# Patient Record
Sex: FEMALE | Race: WHITE | Employment: FULL TIME | URBAN - METROPOLITAN AREA
[De-identification: names, ages, dates, MRNs, and addresses within clinical notes are randomized per-mention and may not be internally consistent; named-entity substitution may affect disease eponyms.]

---

## 2018-12-20 ENCOUNTER — OFFICE VISIT (OUTPATIENT)
Dept: FAMILY MEDICINE CLINIC | Facility: CLINIC | Age: 63
End: 2018-12-20

## 2018-12-20 VITALS
HEIGHT: 70 IN | HEART RATE: 89 BPM | DIASTOLIC BLOOD PRESSURE: 72 MMHG | WEIGHT: 179 LBS | TEMPERATURE: 99 F | OXYGEN SATURATION: 97 % | BODY MASS INDEX: 25.62 KG/M2 | SYSTOLIC BLOOD PRESSURE: 118 MMHG

## 2018-12-20 DIAGNOSIS — J06.9 UPPER RESPIRATORY TRACT INFECTION, UNSPECIFIED TYPE: Primary | ICD-10-CM

## 2018-12-20 DIAGNOSIS — J02.9 SORE THROAT: ICD-10-CM

## 2018-12-20 PROCEDURE — 99203 OFFICE O/P NEW LOW 30 MIN: CPT | Performed by: PHYSICIAN ASSISTANT

## 2018-12-20 PROCEDURE — 87880 STREP A ASSAY W/OPTIC: CPT | Performed by: PHYSICIAN ASSISTANT

## 2018-12-20 RX ORDER — FLUTICASONE PROPIONATE 50 MCG
2 SPRAY, SUSPENSION (ML) NASAL DAILY
Qty: 1 BOTTLE | Refills: 0 | Status: SHIPPED | OUTPATIENT
Start: 2018-12-20 | End: 2019-01-03

## 2018-12-20 NOTE — PROGRESS NOTES
CHIEF COMPLAINT:   Patient presents with:  Sinus Problem: sore throat/runny nose x 2 days. no fever/vomiting/diarrhea      HPI:   Santiago Pugh is a 66year old female who presents for upper respiratory symptoms for 2 days.  Patient reports nasal congest Posterior pharynx is mildly erythematous. No exudates. Uvula midline. NECK: Supple, non-tender  LUNGS: clear to auscultation bilaterally, no wheezes or rhonchi. Breathing is non labored.   CARDIO: RRR without murmur  EXTREMITIES: no cyanosis, clubbing or

## 2018-12-20 NOTE — PATIENT INSTRUCTIONS
Patient Declined AVS    Verbal Instructions given      1. Flonase  2. Claritin D  3. Increase fluids/ rest  4.  Follow up with PCP

## 2022-02-07 ENCOUNTER — EKG ENCOUNTER (OUTPATIENT)
Dept: LAB | Age: 67
End: 2022-02-07
Attending: ORTHOPAEDIC SURGERY
Payer: COMMERCIAL

## 2022-02-07 ENCOUNTER — LABORATORY ENCOUNTER (OUTPATIENT)
Dept: LAB | Age: 67
End: 2022-02-07
Attending: ORTHOPAEDIC SURGERY
Payer: COMMERCIAL

## 2022-02-07 DIAGNOSIS — M19.012 PRIMARY OSTEOARTHRITIS OF LEFT SHOULDER: ICD-10-CM

## 2022-02-07 LAB
ANION GAP SERPL CALC-SCNC: 6 MMOL/L (ref 0–18)
ANTIBODY SCREEN: NEGATIVE
BASOPHILS # BLD AUTO: 0.05 X10(3) UL (ref 0–0.2)
BASOPHILS NFR BLD AUTO: 0.5 %
BUN BLD-MCNC: 21 MG/DL (ref 7–18)
CALCIUM BLD-MCNC: 10.2 MG/DL (ref 8.5–10.1)
CHLORIDE SERPL-SCNC: 106 MMOL/L (ref 98–112)
CO2 SERPL-SCNC: 27 MMOL/L (ref 21–32)
CREAT BLD-MCNC: 0.68 MG/DL
EOSINOPHIL NFR BLD AUTO: 1.9 %
ERYTHROCYTE [DISTWIDTH] IN BLOOD BY AUTOMATED COUNT: 12.2 %
GLUCOSE BLD-MCNC: 112 MG/DL (ref 70–99)
HCT VFR BLD AUTO: 39.4 %
HGB BLD-MCNC: 13.4 G/DL
IMM GRANULOCYTES # BLD AUTO: 0.03 X10(3) UL (ref 0–1)
IMM GRANULOCYTES NFR BLD: 0.3 %
LYMPHOCYTES # BLD AUTO: 2.21 X10(3) UL (ref 1–4)
LYMPHOCYTES NFR BLD AUTO: 23 %
MCH RBC QN AUTO: 31.8 PG (ref 26–34)
MCHC RBC AUTO-ENTMCNC: 34 G/DL (ref 31–37)
MCV RBC AUTO: 93.6 FL
MONOCYTES # BLD AUTO: 0.78 X10(3) UL (ref 0.1–1)
MONOCYTES NFR BLD AUTO: 8.1 %
NEUTROPHILS # BLD AUTO: 6.37 X10 (3) UL (ref 1.5–7.7)
NEUTROPHILS # BLD AUTO: 6.37 X10(3) UL (ref 1.5–7.7)
NEUTROPHILS NFR BLD AUTO: 66.2 %
OSMOLALITY SERPL CALC.SUM OF ELEC: 292 MOSM/KG (ref 275–295)
P AXIS: 65 DEGREES
P-R INTERVAL: 136 MS
PLATELET # BLD AUTO: 260 10(3)UL (ref 150–450)
POTASSIUM SERPL-SCNC: 4.7 MMOL/L (ref 3.5–5.1)
Q-T INTERVAL: 364 MS
QRS DURATION: 86 MS
QTC CALCULATION (BEZET): 414 MS
R AXIS: 2 DEGREES
RBC # BLD AUTO: 4.21 X10(6)UL
RH BLOOD TYPE: NEGATIVE
SODIUM SERPL-SCNC: 139 MMOL/L (ref 136–145)
T AXIS: 57 DEGREES
VENTRICULAR RATE: 78 BPM
WBC # BLD AUTO: 9.6 X10(3) UL (ref 4–11)

## 2022-02-07 PROCEDURE — 86901 BLOOD TYPING SEROLOGIC RH(D): CPT

## 2022-02-07 PROCEDURE — 93005 ELECTROCARDIOGRAM TRACING: CPT

## 2022-02-07 PROCEDURE — 85025 COMPLETE CBC W/AUTO DIFF WBC: CPT

## 2022-02-07 PROCEDURE — 36415 COLL VENOUS BLD VENIPUNCTURE: CPT

## 2022-02-07 PROCEDURE — 87081 CULTURE SCREEN ONLY: CPT

## 2022-02-07 PROCEDURE — 86900 BLOOD TYPING SEROLOGIC ABO: CPT

## 2022-02-07 PROCEDURE — 86850 RBC ANTIBODY SCREEN: CPT

## 2022-02-07 PROCEDURE — 80048 BASIC METABOLIC PNL TOTAL CA: CPT

## 2022-02-07 PROCEDURE — 93010 ELECTROCARDIOGRAM REPORT: CPT | Performed by: INTERNAL MEDICINE

## 2022-02-28 ENCOUNTER — LAB ENCOUNTER (OUTPATIENT)
Dept: LAB | Age: 67
End: 2022-02-28
Attending: ORTHOPAEDIC SURGERY
Payer: COMMERCIAL

## 2022-02-28 DIAGNOSIS — Z20.822 ENCOUNTER FOR PREPROCEDURE SCREENING LABORATORY TESTING FOR COVID-19: ICD-10-CM

## 2022-02-28 DIAGNOSIS — Z01.812 ENCOUNTER FOR PREPROCEDURE SCREENING LABORATORY TESTING FOR COVID-19: ICD-10-CM

## 2022-03-01 LAB — SARS-COV-2 RNA RESP QL NAA+PROBE: NOT DETECTED

## 2022-03-02 ENCOUNTER — ANESTHESIA EVENT (OUTPATIENT)
Dept: SURGERY | Facility: HOSPITAL | Age: 67
End: 2022-03-02
Payer: COMMERCIAL

## 2022-03-03 ENCOUNTER — HOSPITAL ENCOUNTER (OUTPATIENT)
Facility: HOSPITAL | Age: 67
Setting detail: HOSPITAL OUTPATIENT SURGERY
Discharge: HOME | End: 2022-03-03
Attending: ORTHOPAEDIC SURGERY | Admitting: ORTHOPAEDIC SURGERY
Payer: COMMERCIAL

## 2022-03-03 ENCOUNTER — ANESTHESIA (OUTPATIENT)
Dept: SURGERY | Facility: HOSPITAL | Age: 67
End: 2022-03-03
Payer: COMMERCIAL

## 2022-03-03 ENCOUNTER — APPOINTMENT (OUTPATIENT)
Dept: GENERAL RADIOLOGY | Facility: HOSPITAL | Age: 67
End: 2022-03-03
Attending: ORTHOPAEDIC SURGERY
Payer: COMMERCIAL

## 2022-03-03 VITALS
DIASTOLIC BLOOD PRESSURE: 66 MMHG | RESPIRATION RATE: 16 BRPM | WEIGHT: 172 LBS | TEMPERATURE: 98 F | OXYGEN SATURATION: 96 % | SYSTOLIC BLOOD PRESSURE: 114 MMHG | HEART RATE: 73 BPM | BODY MASS INDEX: 24.08 KG/M2 | HEIGHT: 71 IN

## 2022-03-03 DIAGNOSIS — Z20.822 ENCOUNTER FOR PREPROCEDURE SCREENING LABORATORY TESTING FOR COVID-19: ICD-10-CM

## 2022-03-03 DIAGNOSIS — M19.012 PRIMARY OSTEOARTHRITIS OF LEFT SHOULDER: Primary | ICD-10-CM

## 2022-03-03 DIAGNOSIS — M75.102 NONTRAUMATIC TEAR OF LEFT ROTATOR CUFF, UNSPECIFIED TEAR EXTENT: ICD-10-CM

## 2022-03-03 DIAGNOSIS — Z01.812 ENCOUNTER FOR PREPROCEDURE SCREENING LABORATORY TESTING FOR COVID-19: ICD-10-CM

## 2022-03-03 PROCEDURE — 73020 X-RAY EXAM OF SHOULDER: CPT | Performed by: ORTHOPAEDIC SURGERY

## 2022-03-03 PROCEDURE — 76942 ECHO GUIDE FOR BIOPSY: CPT | Performed by: ANESTHESIOLOGY

## 2022-03-03 PROCEDURE — 0RRK00Z REPLACEMENT OF LEFT SHOULDER JOINT WITH REVERSE BALL AND SOCKET SYNTHETIC SUBSTITUTE, OPEN APPROACH: ICD-10-PCS | Performed by: ORTHOPAEDIC SURGERY

## 2022-03-03 DEVICE — DELTA XTEND LOCKING METAGLENE SCREW DIA 4.5 LG 30MM
Type: IMPLANTABLE DEVICE | Site: SHOULDER | Status: FUNCTIONAL
Brand: DELTA XTEND

## 2022-03-03 DEVICE — DELTA XTEND LOCKING METAGLENE SCREW DIA 4.5 LG 24MM
Type: IMPLANTABLE DEVICE | Site: SHOULDER | Status: FUNCTIONAL
Brand: DELTA XTEND

## 2022-03-03 DEVICE — DELTA XTEND CEMENTLESS METAGLENE HA
Type: IMPLANTABLE DEVICE | Site: SHOULDER | Status: FUNCTIONAL
Brand: DELTA XTEND

## 2022-03-03 DEVICE — DELTA XTEND NON LOCKING METAGLENE SCREW DIA 4.5 LG 18MM
Type: IMPLANTABLE DEVICE | Site: SHOULDER | Status: FUNCTIONAL
Brand: DELTA XTEND

## 2022-03-03 DEVICE — DELTA XTEND CEMENTLESS MODULAR ECCENTRIC EPIPHYSIS SZ1 / LEFT HA
Type: IMPLANTABLE DEVICE | Site: SHOULDER | Status: FUNCTIONAL
Brand: DELTA XTEND

## 2022-03-03 DEVICE — DELTA XTEND CEMENTLESS MODULAR HUMERAL STEM DIA 12 HA
Type: IMPLANTABLE DEVICE | Site: SHOULDER | Status: FUNCTIONAL
Brand: DELTA XTEND

## 2022-03-03 DEVICE — DELTA XTEND PREMIERON X-LINKED PE HUMERAL CUP HIGH MOBILITY SIZE 38 +3MM
Type: IMPLANTABLE DEVICE | Site: SHOULDER | Status: FUNCTIONAL
Brand: DELTA XTEND

## 2022-03-03 RX ORDER — TIZANIDINE 2 MG/1
2 TABLET ORAL EVERY 6 HOURS PRN
Status: DISCONTINUED | OUTPATIENT
Start: 2022-03-03 | End: 2022-03-03

## 2022-03-03 RX ORDER — DEXAMETHASONE SODIUM PHOSPHATE 10 MG/ML
INJECTION, SOLUTION INTRAMUSCULAR; INTRAVENOUS AS NEEDED
Status: DISCONTINUED | OUTPATIENT
Start: 2022-03-03 | End: 2022-03-03 | Stop reason: SURG

## 2022-03-03 RX ORDER — DEXAMETHASONE SODIUM PHOSPHATE 10 MG/ML
8 INJECTION, SOLUTION INTRAMUSCULAR; INTRAVENOUS ONCE
Status: DISCONTINUED | OUTPATIENT
Start: 2022-03-04 | End: 2022-03-03

## 2022-03-03 RX ORDER — HYDROCODONE BITARTRATE AND ACETAMINOPHEN 5; 325 MG/1; MG/1
1 TABLET ORAL AS NEEDED
Status: DISCONTINUED | OUTPATIENT
Start: 2022-03-03 | End: 2022-03-03

## 2022-03-03 RX ORDER — ONDANSETRON 2 MG/ML
4 INJECTION INTRAMUSCULAR; INTRAVENOUS AS NEEDED
Status: DISCONTINUED | OUTPATIENT
Start: 2022-03-03 | End: 2022-03-03

## 2022-03-03 RX ORDER — CEFAZOLIN SODIUM 1 G/3ML
INJECTION, POWDER, FOR SOLUTION INTRAMUSCULAR; INTRAVENOUS AS NEEDED
Status: DISCONTINUED | OUTPATIENT
Start: 2022-03-03 | End: 2022-03-03 | Stop reason: SURG

## 2022-03-03 RX ORDER — HYDROMORPHONE HYDROCHLORIDE 1 MG/ML
0.8 INJECTION, SOLUTION INTRAMUSCULAR; INTRAVENOUS; SUBCUTANEOUS EVERY 2 HOUR PRN
Status: DISCONTINUED | OUTPATIENT
Start: 2022-03-03 | End: 2022-03-03

## 2022-03-03 RX ORDER — ACETAMINOPHEN 325 MG/1
650 TABLET ORAL 4 TIMES DAILY
OUTPATIENT
Start: 2022-03-03

## 2022-03-03 RX ORDER — ACETAMINOPHEN 325 MG/1
650 TABLET ORAL ONCE
Status: COMPLETED | OUTPATIENT
Start: 2022-03-03 | End: 2022-03-03

## 2022-03-03 RX ORDER — HYDROMORPHONE HYDROCHLORIDE 1 MG/ML
0.5 INJECTION, SOLUTION INTRAMUSCULAR; INTRAVENOUS; SUBCUTANEOUS EVERY 5 MIN PRN
Status: DISCONTINUED | OUTPATIENT
Start: 2022-03-03 | End: 2022-03-03

## 2022-03-03 RX ORDER — HYDROCODONE BITARTRATE AND ACETAMINOPHEN 5; 325 MG/1; MG/1
2 TABLET ORAL AS NEEDED
Status: DISCONTINUED | OUTPATIENT
Start: 2022-03-03 | End: 2022-03-03

## 2022-03-03 RX ORDER — NALOXONE HYDROCHLORIDE 0.4 MG/ML
80 INJECTION, SOLUTION INTRAMUSCULAR; INTRAVENOUS; SUBCUTANEOUS AS NEEDED
Status: DISCONTINUED | OUTPATIENT
Start: 2022-03-03 | End: 2022-03-03

## 2022-03-03 RX ORDER — SODIUM CHLORIDE, SODIUM LACTATE, POTASSIUM CHLORIDE, CALCIUM CHLORIDE 600; 310; 30; 20 MG/100ML; MG/100ML; MG/100ML; MG/100ML
INJECTION, SOLUTION INTRAVENOUS CONTINUOUS
Status: DISCONTINUED | OUTPATIENT
Start: 2022-03-03 | End: 2022-03-03

## 2022-03-03 RX ORDER — TRAMADOL HYDROCHLORIDE 50 MG/1
50 TABLET ORAL EVERY 6 HOURS
Status: DISCONTINUED | OUTPATIENT
Start: 2022-03-03 | End: 2022-03-03

## 2022-03-03 RX ORDER — MEPERIDINE HYDROCHLORIDE 25 MG/ML
25 INJECTION INTRAMUSCULAR; INTRAVENOUS; SUBCUTANEOUS
Status: DISCONTINUED | OUTPATIENT
Start: 2022-03-03 | End: 2022-03-03

## 2022-03-03 RX ORDER — TRANEXAMIC ACID 10 MG/ML
INJECTION, SOLUTION INTRAVENOUS AS NEEDED
Status: DISCONTINUED | OUTPATIENT
Start: 2022-03-03 | End: 2022-03-03 | Stop reason: SURG

## 2022-03-03 RX ORDER — OXYCODONE HYDROCHLORIDE 5 MG/1
10 TABLET ORAL EVERY 4 HOURS PRN
Status: DISCONTINUED | OUTPATIENT
Start: 2022-03-03 | End: 2022-03-03

## 2022-03-03 RX ORDER — SCOLOPAMINE TRANSDERMAL SYSTEM 1 MG/1
1 PATCH, EXTENDED RELEASE TRANSDERMAL ONCE
Status: DISCONTINUED | OUTPATIENT
Start: 2022-03-03 | End: 2022-03-03

## 2022-03-03 RX ORDER — DEXAMETHASONE SODIUM PHOSPHATE 4 MG/ML
4 VIAL (ML) INJECTION AS NEEDED
Status: DISCONTINUED | OUTPATIENT
Start: 2022-03-03 | End: 2022-03-03

## 2022-03-03 RX ORDER — MIDAZOLAM HYDROCHLORIDE 1 MG/ML
1 INJECTION INTRAMUSCULAR; INTRAVENOUS EVERY 5 MIN PRN
Status: DISCONTINUED | OUTPATIENT
Start: 2022-03-03 | End: 2022-03-03

## 2022-03-03 RX ORDER — ACETAMINOPHEN 500 MG
1000 TABLET ORAL ONCE
Status: DISCONTINUED | OUTPATIENT
Start: 2022-03-03 | End: 2022-03-03 | Stop reason: HOSPADM

## 2022-03-03 RX ORDER — HYDROMORPHONE HYDROCHLORIDE 1 MG/ML
0.4 INJECTION, SOLUTION INTRAMUSCULAR; INTRAVENOUS; SUBCUTANEOUS EVERY 2 HOUR PRN
Status: DISCONTINUED | OUTPATIENT
Start: 2022-03-03 | End: 2022-03-03

## 2022-03-03 RX ORDER — VANCOMYCIN HYDROCHLORIDE
15 ONCE
Status: DISCONTINUED | OUTPATIENT
Start: 2022-03-03 | End: 2022-03-03 | Stop reason: HOSPADM

## 2022-03-03 RX ORDER — OXYCODONE HYDROCHLORIDE 5 MG/1
5 TABLET ORAL EVERY 4 HOURS PRN
Status: DISCONTINUED | OUTPATIENT
Start: 2022-03-03 | End: 2022-03-03

## 2022-03-03 RX ORDER — GLYCOPYRROLATE 0.2 MG/ML
INJECTION, SOLUTION INTRAMUSCULAR; INTRAVENOUS AS NEEDED
Status: DISCONTINUED | OUTPATIENT
Start: 2022-03-03 | End: 2022-03-03 | Stop reason: SURG

## 2022-03-03 RX ORDER — METOCLOPRAMIDE HYDROCHLORIDE 5 MG/ML
INJECTION INTRAMUSCULAR; INTRAVENOUS AS NEEDED
Status: DISCONTINUED | OUTPATIENT
Start: 2022-03-03 | End: 2022-03-03 | Stop reason: SURG

## 2022-03-03 RX ORDER — KETOROLAC TROMETHAMINE 30 MG/ML
30 INJECTION, SOLUTION INTRAMUSCULAR; INTRAVENOUS EVERY 6 HOURS
Status: DISCONTINUED | OUTPATIENT
Start: 2022-03-03 | End: 2022-03-03

## 2022-03-03 RX ORDER — HYDROCODONE BITARTRATE AND ACETAMINOPHEN 10; 325 MG/1; MG/1
1 TABLET ORAL EVERY 6 HOURS PRN
Qty: 20 TABLET | Refills: 0 | Status: SHIPPED | OUTPATIENT
Start: 2022-03-03

## 2022-03-03 RX ORDER — ACETAMINOPHEN 325 MG/1
TABLET ORAL
Status: COMPLETED
Start: 2022-03-03 | End: 2022-03-03

## 2022-03-03 RX ORDER — BUPRENORPHINE HYDROCHLORIDE 0.32 MG/ML
INJECTION INTRAMUSCULAR; INTRAVENOUS AS NEEDED
Status: DISCONTINUED | OUTPATIENT
Start: 2022-03-03 | End: 2022-03-03 | Stop reason: SURG

## 2022-03-03 RX ORDER — DEXAMETHASONE SODIUM PHOSPHATE 4 MG/ML
VIAL (ML) INJECTION AS NEEDED
Status: DISCONTINUED | OUTPATIENT
Start: 2022-03-03 | End: 2022-03-03 | Stop reason: SURG

## 2022-03-03 RX ADMIN — GLYCOPYRROLATE 0.4 MG: 0.2 INJECTION, SOLUTION INTRAMUSCULAR; INTRAVENOUS at 08:48:00

## 2022-03-03 RX ADMIN — DEXAMETHASONE SODIUM PHOSPHATE 2 MG: 10 INJECTION, SOLUTION INTRAMUSCULAR; INTRAVENOUS at 07:09:00

## 2022-03-03 RX ADMIN — TRANEXAMIC ACID 1000 MG: 10 INJECTION, SOLUTION INTRAVENOUS at 07:15:00

## 2022-03-03 RX ADMIN — METOCLOPRAMIDE HYDROCHLORIDE 10 MG: 5 INJECTION INTRAMUSCULAR; INTRAVENOUS at 08:55:00

## 2022-03-03 RX ADMIN — SODIUM CHLORIDE, SODIUM LACTATE, POTASSIUM CHLORIDE, CALCIUM CHLORIDE: 600; 310; 30; 20 INJECTION, SOLUTION INTRAVENOUS at 08:32:00

## 2022-03-03 RX ADMIN — BUPRENORPHINE HYDROCHLORIDE 150 MCG: 0.32 INJECTION INTRAMUSCULAR; INTRAVENOUS at 07:09:00

## 2022-03-03 RX ADMIN — CEFAZOLIN SODIUM 2 G: 1 INJECTION, POWDER, FOR SOLUTION INTRAMUSCULAR; INTRAVENOUS at 07:05:00

## 2022-03-03 RX ADMIN — DEXAMETHASONE SODIUM PHOSPHATE 4 MG: 4 MG/ML VIAL (ML) INJECTION at 09:21:00

## 2022-03-03 NOTE — ANESTHESIA PROCEDURE NOTES
Regional Block  Performed by: Roly Whitt MD  Authorized by: Roly Whitt MD       General Information and Staff    Start Time:  3/3/2022 7:06 AM  End Time:  3/3/2022 7:09 AM  Anesthesiologist:  Karla Emerson MD  Performed by: Anesthesiologist  Patient Location:  OR      Site Identification: real time ultrasound guided and image stored and retrievable    Block site/laterality marked before start: site marked  Reason for Block: at surgeon's request and post-op pain management    Preanesthetic Checklist: 2 patient identifers, IV checked, site marked, risks and benefits discussed, monitors and equipment checked, pre-op evaluation, timeout performed, anesthesia consent, sterile technique used, no prohibitive neurological deficits and no local skin infection at insertion site      Procedure Details    Patient Position:  Supine  Prep: ChloraPrep    Monitoring:  Cardiac monitor, continuous pulse ox and blood pressure cuff  Block Type: Interscalene  Laterality:  Left  Injection Technique:  Single-shot    Needle    Needle Type:  Short-bevel and echogenic  Needle Localization:  Ultrasound guidance  Reason for Ultrasound Use: appropriate spread of the medication was noted in real time and no ultrasound evidence of intravascular and/or intraneural injection            Assessment    Injection Assessment:  Good spread noted, negative resistance, negative aspiration for heme, incremental injection, low pressure and local visualized surrounding nerve on ultrasound  Paresthesia Pain:  Immediately resolved  Heart Rate Change: No    - Patient tolerated block procedure well without evidence of immediate block related complications.      Medications      Additional Comments    Medication:  Bupivacaine 0.375% 20mL with 2mg PF-dexamethasone and 150mcg buprenorphine

## 2022-03-03 NOTE — ANESTHESIA PROCEDURE NOTES
Airway  Date/Time: 3/3/2022 7:12 AM  Urgency: elective    Airway not difficult    General Information and Staff    Patient location during procedure: OR  Anesthesiologist: Laverne Whitt MD  Performed: anesthesiologist     Indications and Patient Condition  Indications for airway management: anesthesia  Spontaneous ventilation: present  Sedation level: deep  Preoxygenated: yes  Patient position: sniffing  Mask difficulty assessment: 1 - vent by mask    Final Airway Details  Final airway type: supraglottic airway      Successful airway: classic  Size 3      Number of attempts at approach: 1  Number of other approaches attempted: 0

## 2022-03-03 NOTE — BRIEF OP NOTE
Pre-Operative Diagnosis: Nontraumatic tear of left rotator cuff, unspecified tear extent [M75.102]  Primary osteoarthritis of left shoulder [M19.012]     Post-Operative Diagnosis: Nontraumatic tear of left rotator cuff, unspecified tear extent [M75.102]Primary osteoarthritis of left shoulder [M19.012]      Procedure Performed:   LEFT REVERSE TOTAL SHOULDER ARTHROPLASTY    Surgeon(s) and Role:     Rivka Saldana MD - Primary    Assistant(s):  PA:  Mo Da Silva PA-C     Surgical Findings: c/w dx     Specimen: none     Estimated Blood Loss: Blood Output: 100 mL (3/3/2022  9:09 AM)      Dictation Number:       Jeannette Hopkins MD  3/3/2022  9:13 AM

## 2022-03-03 NOTE — ANESTHESIA POSTPROCEDURE EVALUATION
[address] Patient Status:  Outpatient in a Bed   Age/Gender 61year old female MRN [id number]   Location 13[facility] Attending Leticia Graham MD   [facility] Day # 0 PCP Tariq Ku MD       Anesthesia Post-op Note    LEFT REVERSE TOTAL SHOULDER ARTHROPLASTY    Procedure Summary     Date: 03/03/22 Room / Location: 1404 [facility] MAIN OR 10 / 1404 [facility] OR    Anesthesia Start: 8221 Anesthesia Stop: 7232    Procedure: LEFT REVERSE TOTAL SHOULDER ARTHROPLASTY (Left Shoulder) Diagnosis:       Nontraumatic tear of left rotator cuff, unspecified tear extent      Primary osteoarthritis of left shoulder      (Nontraumatic tear of left rotator cuff, unspecified tear extent [M75.102]Primary osteoarthritis of left shoulder [M19.012])    Surgeons: Leticia Graham MD Anesthesiologist: Robin Julien MD    Anesthesia Type: general ASA Status: 1          Anesthesia Type: general    Vitals Value Taken Time   /72 03/03/22 0944   Temp 97.5 03/03/22 0944   Pulse 88 03/03/22 0943   Resp 12 03/03/22 0943   SpO2 98 % 03/03/22 0943   Vitals shown include unvalidated device data. Patient Location: PACU    Anesthesia Type: general    Airway Patency: patent    Postop Pain Control: adequate    Mental Status: mildly sedated but able to meaningfully participate in the post-anesthesia evaluation    Nausea/Vomiting: none    Cardiopulmonary/Hydration status: stable euvolemic    Complications: no apparent anesthesia related complications    Postop vital signs: stable    Dental Exam: Unchanged from Preop    Patient to be discharged home when criteria met.

## 2022-03-03 NOTE — OPERATIVE REPORT
[facility]    PATIENT'S NAME: Howie Silveira   ATTENDING PHYSICIAN: Aki Allen M.D. OPERATING PHYSICIAN: Aki Allen M.D. PATIENT ACCOUNT#:   [de-identified]    LOCATION:  PACU [address] [place], [place] [address]U 4 ED 10  MEDICAL RECORD #:   [id number]       YOB: 1955  ADMISSION DATE:       03/03/2022      OPERATION DATE:  03/03/2022    OPERATIVE REPORT      PREOPERATIVE DIAGNOSIS:  Left shoulder glenohumeral joint osteoarthritis. POSTOPERATIVE DIAGNOSIS:  Left shoulder glenohumeral joint osteoarthritis. PROCEDURE:  Left shoulder reverse total shoulder arthroplasty with [employer] components, a size 12 stem, a size 1 metaphyseal component, a +3 high mobility poly component, a size 38 concentric Glenosphere standard metaglene component and 2 locking and 2 nonlocking screws. ASSISTANT:  Louie Landeros PA-C, whose duties included helping to position the limb, hold retractors, and assistant in implantation of the prosthesis. ANESTHESIA:  General plus interscalene block. ESTIMATED BLOOD LOSS:  100 mL. FINDINGS:  Patient with severe glenohumeral joint osteoarthritis and severe rotator cuff dysfunction. SPECIMENS:  None. COMPLICATIONS:  None. DISPOSITION:  Transfer to recovery room. PLAN:  The patient will be sent home with postop written and oral instructions as well as oral narcotics for postop pain. She can begin range of motion exercises postop day 1. INDICATIONS:  The patient is a 79-year-old female with a history of left shoulder glenohumeral joint osteoarthritis and high-grade partial tearing of supraspinatus. She had posterior subluxation of the proximal humerus and pseudosubluxation of shoulder. She failed nonoperative options including therapy, injections, and medications. She was therefore offered surgical intervention.   The risks and benefits of the procedure were discussed in detail with the patient including the risk of chronic pain, instability, stiffness, and infection. She shows good understanding of these issues and wished to proceed with surgery. OPERATIVE TECHNIQUE:  On the date of operation, I saw the patient in the holding room and initialed the surgical site. The patient was taken to the operating room and was placed supine on the OR table. She was given a preoperative dose of antibiotics. Interscalene block was performed by Anesthesia. This followed by a general endotracheal anesthesia. The patient was placed in modified beach chair position. The left shoulder was then prepped and draped in standard surgical fashion. A surgical time-out was taken to ensure proper patient, surgical site, and procedure verified. An anterior approach to the shoulder was performed. Dissection was carried down through soft tissue and full-thickness skin flaps were raised. The interval between the deltoid and the pectoralis major was identified. The cephalic vein was identified and was preserved throughout the case. The superior portion of the pectoralis major tendon was released at its insertion on the humerus. Dissection was then carried out medial to the conjoined tendon, and the coracoacromial ligament was partially released. The sheath surrounding the long head of the biceps was opened and the long head of the biceps was released and then was tenodesed into its sheath with 0 Vicryl suture. The subscapularis was then peeled from the lesser tuberosity and the anterior capsule was released. The shoulder was externally rotated and the shoulder was dislocated. The drill was used to enter the humeral head at its highest point, and the reamer was then used to ream the intramedullary canal to a size 12. The last reamer was left in place and the cutting guide was placed. The oscillating saw was used to make a 150-degree cut in 10 degrees retroversion. The cap was placed over the metaphyseal cut and the humerus was then reduced.   The anterior and inferior capsule were released, and the glenoid was exposed. The remainder of the long head of the biceps was removed as well as the remnants of the labrum. The guide for the metaglene component was placed along the inferior border of the glenoid. Central wire was placed and then the glenoid was reamed down to subchondral bone. The central hole for the metaglene was then drilled out and the metaglene was then press-fit into place. This was then secured with 4 screws with the superior and inferior screws being locking screws and anterior and posterior screws being nonlocking screws. The 38 mm concentric Glenosphere was then threaded onto the metaglene component. Once this had been secured, the humeral head was then dislocated again. The guide for the reamer was then placed and a size 1 posterior offset trial was found to be a good fit. The metaphyseal area was then reamed. A trial was placed and a +3 poly was found to be a good fit. The trial was removed and the shaft was then copiously irrigated. The component was then press-fit into place. A +3 trial was found to be a good fit and the +3 high mobility poly was then press-fit onto the stem. The shoulder was reduced. This was placed through a full range of motion and good stability was noted. The wound was copiously irrigated and skin flaps were closed with 2-0 Vicryl in subcutaneous tissue followed by a subcutaneous 4-0 Monocryl stitch. Sterile bulky dressings were applied. The patient was awakened from anesthesia and was taken to the recovery room in fair condition. She will be sent home with postop written and oral instructions as well as oral narcotics for postop pain. She will follow up in 1 week for wound check.     Dictated By Sharon Crespo M.D.  d: 03/03/2022 09:15:59  t: 03/03/2022 09:46:03  Job [id number]  /

## 2022-03-14 PROBLEM — M19.012 OSTEOARTHRITIS OF LEFT SHOULDER, UNSPECIFIED OSTEOARTHRITIS TYPE: Status: ACTIVE | Noted: 2022-03-14

## 2022-03-14 PROBLEM — Z96.612 S/P REVERSE TOTAL SHOULDER ARTHROPLASTY, LEFT: Status: ACTIVE | Noted: 2022-03-14

## 2024-11-29 ENCOUNTER — OFFICE VISIT (OUTPATIENT)
Dept: URGENT CARE | Age: 69
End: 2024-11-29
Payer: MEDICARE

## 2024-11-29 VITALS
HEIGHT: 68 IN | OXYGEN SATURATION: 95 % | WEIGHT: 165 LBS | HEART RATE: 74 BPM | RESPIRATION RATE: 18 BRPM | BODY MASS INDEX: 25.01 KG/M2 | SYSTOLIC BLOOD PRESSURE: 112 MMHG | DIASTOLIC BLOOD PRESSURE: 75 MMHG | TEMPERATURE: 98.1 F

## 2024-11-29 DIAGNOSIS — R05.9 COUGH IN ADULT: ICD-10-CM

## 2024-11-29 DIAGNOSIS — J06.9 URI, ACUTE: Primary | ICD-10-CM

## 2024-11-29 LAB
POC BINAX EXPIRATION: 0
POC BINAX NOW COVID SERIAL NUMBER: 0
POC SARS-COV-2 AG BINAX: NORMAL

## 2024-11-29 RX ORDER — BROMPHENIRAMINE MALEATE, PSEUDOEPHEDRINE HYDROCHLORIDE, AND DEXTROMETHORPHAN HYDROBROMIDE 2; 30; 10 MG/5ML; MG/5ML; MG/5ML
10 SYRUP ORAL 4 TIMES DAILY PRN
Qty: 200 ML | Refills: 0 | Status: SHIPPED | OUTPATIENT
Start: 2024-11-29 | End: 2024-12-04

## 2024-11-29 ASSESSMENT — PAIN SCALES - GENERAL: PAINLEVEL_OUTOF10: 8

## 2024-11-29 ASSESSMENT — ENCOUNTER SYMPTOMS: COUGH: 1

## 2024-11-29 NOTE — PROGRESS NOTES
"Subjective   Patient ID: Abisai Jimenes is a 69 y.o. female. They present today with a chief complaint of Cough (Cough with congestion x 1 day).    History of Present Illness  Pt presents with cough and congestion x 1 day. No fever, no SOB, CP or pain with deep inspiration. No other associated symptoms or concerns to address at this time. Pt able to talk in full sentences. No acute distress.       Cough        Past Medical History  Allergies as of 11/29/2024 - Reviewed 11/29/2024   Allergen Reaction Noted    Amoxicillin Rash 12/20/2018       (Not in a hospital admission)       No past medical history on file.    No past surgical history on file.     reports that she has never smoked. She has never used smokeless tobacco.    Review of Systems  Review of Systems   Respiratory:  Positive for cough.    10 point ROS completed and all are negative other than what is stated in the current HPI                                 Objective    Vitals:    11/29/24 1345   BP: 112/75   BP Location: Left arm   Patient Position: Sitting   Pulse: 74   Resp: 18   Temp: 36.7 °C (98.1 °F)   SpO2: 95%   Weight: 74.8 kg (165 lb)   Height: 1.727 m (5' 8\")     No LMP recorded.    Physical Exam  Vitals and nursing note reviewed.   Constitutional:       Appearance: Normal appearance. She is not ill-appearing.   HENT:      Head: Normocephalic and atraumatic.      Nose: Nose normal.      Mouth/Throat:      Mouth: Mucous membranes are moist.      Comments: (+) post nasal discharge  Eyes:      Pupils: Pupils are equal, round, and reactive to light.   Cardiovascular:      Rate and Rhythm: Normal rate and regular rhythm.      Heart sounds: Normal heart sounds.   Pulmonary:      Effort: Pulmonary effort is normal.      Breath sounds: Normal breath sounds. No wheezing or rhonchi.   Musculoskeletal:      Cervical back: No tenderness.   Lymphadenopathy:      Cervical: No cervical adenopathy.   Skin:     General: Skin is warm and dry.      Findings: No " rash.   Neurological:      Mental Status: She is alert.         Procedures    Point of Care Test & Imaging Results from this visit  No results found for this visit on 11/29/24.   No results found.    Diagnostic study results (if any) were reviewed by BON Samaniego.    Assessment/Plan   Allergies, medications, history, and pertinent labs/EKGs/Imaging reviewed by BON Samaniego.     Medical Decision Making  Acute Cough/URI:  - COVID neg  - Good oral hydration; avoid milk products  - Galileo's Vapor rub; humidifier; warm showers  - Take medications as prescribed  - Advised on s/s to seek emergent care for  - f/u with PCP in the next 3-5 days if no better    Orders and Diagnoses  Diagnoses and all orders for this visit:  Cough in adult  -     POCT Covid-19 Rapid Antigen      Medical Admin Record      Patient disposition: Home    Electronically signed by BON Samaniego  1:57 PM

## 2024-11-29 NOTE — PATIENT INSTRUCTIONS
Acute Cough/URI:  - COVID neg  - Good oral hydration; avoid milk products  - Galileo's Vapor rub; humidifier; warm showers  - Take medications as prescribed  - Advised on s/s to seek emergent care for  - f/u with PCP in the next 3-5 days if no better

## 2025-01-20 ENCOUNTER — APPOINTMENT (OUTPATIENT)
Dept: GENERAL RADIOLOGY | Age: 70
End: 2025-01-20
Attending: EMERGENCY MEDICINE
Payer: MEDICARE

## 2025-01-20 ENCOUNTER — HOSPITAL ENCOUNTER (EMERGENCY)
Age: 70
Discharge: HOME | End: 2025-01-20
Attending: EMERGENCY MEDICINE
Payer: MEDICARE

## 2025-01-20 VITALS
RESPIRATION RATE: 20 BRPM | DIASTOLIC BLOOD PRESSURE: 44 MMHG | HEIGHT: 71 IN | WEIGHT: 170 LBS | SYSTOLIC BLOOD PRESSURE: 105 MMHG | OXYGEN SATURATION: 97 % | TEMPERATURE: 98 F | HEART RATE: 86 BPM | BODY MASS INDEX: 23.8 KG/M2

## 2025-01-20 VITALS
OXYGEN SATURATION: 99 % | RESPIRATION RATE: 18 BRPM | TEMPERATURE: 98 F | DIASTOLIC BLOOD PRESSURE: 73 MMHG | SYSTOLIC BLOOD PRESSURE: 109 MMHG | WEIGHT: 170 LBS | HEIGHT: 71 IN | BODY MASS INDEX: 23.8 KG/M2 | HEART RATE: 83 BPM

## 2025-01-20 DIAGNOSIS — I47.10 SVT (SUPRAVENTRICULAR TACHYCARDIA) (HCC): Primary | ICD-10-CM

## 2025-01-20 LAB
ALBUMIN SERPL-MCNC: 4.5 G/DL (ref 3.2–4.8)
ALBUMIN/GLOB SERPL: 1.6 {RATIO} (ref 1–2)
ALP LIVER SERPL-CCNC: 111 U/L
ALT SERPL-CCNC: 24 U/L
ANION GAP SERPL CALC-SCNC: 6 MMOL/L (ref 0–18)
AST SERPL-CCNC: 21 U/L (ref ?–34)
ATRIAL RATE: 93 BPM
BASOPHILS # BLD AUTO: 0.05 X10(3) UL (ref 0–0.2)
BASOPHILS NFR BLD AUTO: 0.6 %
BILIRUB SERPL-MCNC: 0.8 MG/DL (ref 0.2–1.1)
BUN BLD-MCNC: 28 MG/DL (ref 9–23)
CALCIUM BLD-MCNC: 11 MG/DL (ref 8.7–10.6)
CHLORIDE SERPL-SCNC: 107 MMOL/L (ref 98–112)
CHOLEST SERPL-MCNC: 212 MG/DL (ref ?–200)
CO2 SERPL-SCNC: 26 MMOL/L (ref 21–32)
CREAT BLD-MCNC: 0.69 MG/DL
EGFRCR SERPLBLD CKD-EPI 2021: 95 ML/MIN/1.73M2 (ref 60–?)
EOSINOPHIL # BLD AUTO: 0.17 X10(3) UL (ref 0–0.7)
EOSINOPHIL NFR BLD AUTO: 2 %
ERYTHROCYTE [DISTWIDTH] IN BLOOD BY AUTOMATED COUNT: 11.9 %
GLOBULIN PLAS-MCNC: 2.9 G/DL (ref 2–3.5)
GLUCOSE BLD-MCNC: 97 MG/DL (ref 70–99)
HCT VFR BLD AUTO: 40.4 %
HDLC SERPL-MCNC: 65 MG/DL (ref 40–59)
HGB BLD-MCNC: 14.2 G/DL
IMM GRANULOCYTES # BLD AUTO: 0.01 X10(3) UL (ref 0–1)
IMM GRANULOCYTES NFR BLD: 0.1 %
LDLC SERPL CALC-MCNC: 122 MG/DL (ref ?–100)
LYMPHOCYTES # BLD AUTO: 2.33 X10(3) UL (ref 1–4)
LYMPHOCYTES NFR BLD AUTO: 27.6 %
MCH RBC QN AUTO: 32.4 PG (ref 26–34)
MCHC RBC AUTO-ENTMCNC: 35.1 G/DL (ref 31–37)
MCV RBC AUTO: 92.2 FL
MONOCYTES # BLD AUTO: 0.76 X10(3) UL (ref 0.1–1)
MONOCYTES NFR BLD AUTO: 9 %
NEUTROPHILS # BLD AUTO: 5.12 X10 (3) UL (ref 1.5–7.7)
NEUTROPHILS # BLD AUTO: 5.12 X10(3) UL (ref 1.5–7.7)
NEUTROPHILS NFR BLD AUTO: 60.7 %
NONHDLC SERPL-MCNC: 147 MG/DL (ref ?–130)
OSMOLALITY SERPL CALC.SUM OF ELEC: 293 MOSM/KG (ref 275–295)
P AXIS: 60 DEGREES
P-R INTERVAL: 138 MS
PLATELET # BLD AUTO: 280 10(3)UL (ref 150–450)
POTASSIUM SERPL-SCNC: 4.2 MMOL/L (ref 3.5–5.1)
PROT SERPL-MCNC: 7.4 G/DL (ref 5.7–8.2)
Q-T INTERVAL: 338 MS
QRS DURATION: 80 MS
QTC CALCULATION (BEZET): 420 MS
R AXIS: -3 DEGREES
RBC # BLD AUTO: 4.38 X10(6)UL
SODIUM SERPL-SCNC: 139 MMOL/L (ref 136–145)
T AXIS: 59 DEGREES
TRIGL SERPL-MCNC: 140 MG/DL (ref 30–149)
TROPONIN I SERPL HS-MCNC: 13 NG/L
TROPONIN I SERPL HS-MCNC: 48 NG/L
TSI SER-ACNC: 0.82 UIU/ML (ref 0.55–4.78)
VENTRICULAR RATE: 93 BPM
VLDLC SERPL CALC-MCNC: 25 MG/DL (ref 0–30)
WBC # BLD AUTO: 8.4 X10(3) UL (ref 4–11)

## 2025-01-20 PROCEDURE — 93005 ELECTROCARDIOGRAM TRACING: CPT

## 2025-01-20 PROCEDURE — 93010 ELECTROCARDIOGRAM REPORT: CPT

## 2025-01-20 PROCEDURE — 99284 EMERGENCY DEPT VISIT MOD MDM: CPT

## 2025-01-20 PROCEDURE — 71045 X-RAY EXAM CHEST 1 VIEW: CPT | Performed by: EMERGENCY MEDICINE

## 2025-01-20 PROCEDURE — 84484 ASSAY OF TROPONIN QUANT: CPT | Performed by: EMERGENCY MEDICINE

## 2025-01-20 PROCEDURE — 84443 ASSAY THYROID STIM HORMONE: CPT | Performed by: EMERGENCY MEDICINE

## 2025-01-20 PROCEDURE — 99291 CRITICAL CARE FIRST HOUR: CPT

## 2025-01-20 PROCEDURE — 96374 THER/PROPH/DIAG INJ IV PUSH: CPT

## 2025-01-20 PROCEDURE — 85025 COMPLETE CBC W/AUTO DIFF WBC: CPT | Performed by: EMERGENCY MEDICINE

## 2025-01-20 PROCEDURE — 80061 LIPID PANEL: CPT | Performed by: EMERGENCY MEDICINE

## 2025-01-20 PROCEDURE — 36415 COLL VENOUS BLD VENIPUNCTURE: CPT

## 2025-01-20 PROCEDURE — 80053 COMPREHEN METABOLIC PANEL: CPT | Performed by: EMERGENCY MEDICINE

## 2025-01-20 RX ORDER — DILTIAZEM HYDROCHLORIDE 30 MG/1
60 TABLET, FILM COATED ORAL ONCE
Status: COMPLETED | OUTPATIENT
Start: 2025-01-20 | End: 2025-01-20

## 2025-01-20 RX ORDER — ADENOSINE 3 MG/ML
6 INJECTION, SOLUTION INTRAVENOUS ONCE
Status: COMPLETED | OUTPATIENT
Start: 2025-01-20 | End: 2025-01-20

## 2025-01-20 RX ORDER — DILTIAZEM HYDROCHLORIDE 120 MG/1
120 CAPSULE, COATED, EXTENDED RELEASE ORAL DAILY
Qty: 30 CAPSULE | Refills: 0 | Status: SHIPPED | OUTPATIENT
Start: 2025-01-20 | End: 2025-01-20

## 2025-01-20 RX ORDER — ADENOSINE 3 MG/ML
INJECTION, SOLUTION INTRAVENOUS
Status: COMPLETED
Start: 2025-01-20 | End: 2025-01-20

## 2025-01-20 RX ORDER — DILTIAZEM HYDROCHLORIDE 120 MG/1
120 TABLET, FILM COATED ORAL 4 TIMES DAILY
Qty: 30 TABLET | Refills: 0 | Status: SHIPPED | OUTPATIENT
Start: 2025-01-20

## 2025-01-20 NOTE — ED INITIAL ASSESSMENT (HPI)
Pt presents with Tachycardia that began about 1hr PTA. Pt reports she was out to a walk and noticed her watch alert her to a faster than normal heart rate. Pt states the highest recorded HR at home was 195. Pt denies chest pain, endorses feeling her heart racing. No significant cardiac history.

## 2025-01-20 NOTE — ED PROVIDER NOTES
Patient Seen in: [facility] [place]      History     Chief Complaint   Patient presents with    Fatigue     Stated Complaint: generalized weakness that started around 1300, issues with blood pressure    Subjective:   HPI      69-year-old female with a past medical history as below presents with palpitations that started about an hour ago.  Patient states Apple Watch noted heart rate of 180.  Patient denies associated chest pain or shortness of breath.  Partner states she appeared little shaky but patient denies feeling lightheaded.  She states she was feeling well prior to this episode.  Patient states she had some palpitations about 20 years ago but has never been diagnosed with any arrhythmia.      Objective:     Past Medical History:    Migraines    occ    Osteoarthritis    shoulder    Visual impairment    contacts/glasses              Past Surgical History:   Procedure Laterality Date    Shoulder arthroplasty Left     Tonsillectomy                  Social History     Socioeconomic History    Marital status: Single   Tobacco Use    Smoking status: Never    Smokeless tobacco: Never   Vaping Use    Vaping status: Never Used   Substance and Sexual Activity    Alcohol use: Yes     Comment: occ    Drug use: Never   Social History Narrative    ** Merged History Encounter **                       Physical Exam     ED Triage Vitals [01/20/25 1348]   BP 96/66   Pulse (!) 195   Resp 16   Temp 97.6 °F (36.4 °C)   Temp src Oral   SpO2 98 %   O2 Device None (Room air)       Current Vitals:   Vital Signs  BP: 105/44  Pulse: 86  Resp: 20  Temp: 97.6 °F (36.4 °C)  Temp src: Oral    Oxygen Therapy  SpO2: 97 %  O2 Device: None (Room air)        Physical Exam  Vitals and nursing note reviewed.   Constitutional:       Appearance: She is well-developed.   HENT:      Head: Normocephalic and atraumatic.      Mouth/Throat:      Mouth: Mucous membranes are moist.   Eyes:      General: No scleral  icterus.  Cardiovascular:      Rate and Rhythm: Regular rhythm. Tachycardia present.   Pulmonary:      Effort: Pulmonary effort is normal.      Breath sounds: Normal breath sounds.   Abdominal:      Palpations: Abdomen is soft.   Musculoskeletal:      Right lower leg: No edema.      Left lower leg: No edema.   Skin:     General: Skin is warm and dry.   Neurological:      General: No focal deficit present.      Mental Status: She is alert and oriented to person, place, and time.      Cranial Nerves: No cranial nerve deficit.      Motor: No weakness.   Psychiatric:         Mood and Affect: Mood normal.         Behavior: Behavior normal.             ED Course     Labs Reviewed   COMP METABOLIC PANEL (14) - Abnormal; Notable for the following components:       Result Value    BUN 28 (*)     Calcium, Total 11.0 (*)     All other components within normal limits   TROPONIN I HIGH SENSITIVITY - Normal   CBC WITH DIFFERENTIAL WITH PLATELET   TSH W REFLEX TO FREE T4   RAINBOW DRAW LAVENDER   RAINBOW DRAW LIGHT GREEN   RAINBOW DRAW BLUE   RAINBOW DRAW BLUE     EKG    Rate, intervals and axes as noted on EKG Report.  Rate: 93  Rhythm: Sinus Rhythm  Reading: Normal sinus rhythm, no ST/T wave                XR CHEST AP PORTABLE  (CPT=71045)    Result Date: 1/20/2025  CONCLUSION:  Normal heart size and pulmonary vascularity.  No focal infiltrate, consolidation, effusion or pneumothorax.   LOCATION:  [id number]      Dictated by (CST): Lizabeth Khanna MD on 1/20/2025 at 3:54 PM     Finalized by (CST): Lizabeth Khanna MD on 1/20/2025 at 3:55 PM             MDM      69-year-old female with a past medical history as below presents with palpitations that started about an hour ago.  Patient states Apple Watch noted heart rate of 180.    Differential includes but is not limited to tachyarrhythmia such as SVT, paroxysmal A-fib/flutter    Patient had initial heart rate of 195 in triage.  RN had patient bear down and heart rate decreased to 105  when I came to assess the patient.  EKG shows normal sinus rhythm.  Monitor strip reviewed when patient had a heart rate in 180s and appears to be SVT.      Labs are unremarkable.    Independent interpretation of chest x-ray shows no cardiomegaly and clear lungs.  Radiology report reviewed as above.    Patient was monitored for 2 hours without any further ectopy or arrhythmia.    Instructed to follow-up with cardiology for further outpatient management.  Return precaution discussed    Medical Decision Making  Amount and/or Complexity of Data Reviewed  Independent Historian: spouse     Details: See HPI  Labs: ordered. Decision-making details documented in ED Course.  Radiology: ordered and independent interpretation performed. Decision-making details documented in ED Course.  ECG/medicine tests: ordered and independent interpretation performed. Decision-making details documented in ED Course.        Disposition and Plan     Clinical Impression:  1. SVT (supraventricular tachycardia) (HCC)         Disposition:  Discharge  1/20/2025  4:19 pm    Follow-up:  Misael Wynn MD  [address]  [facility] [address]  [phone]    Schedule an appointment as soon as possible for a visit      [facility]  [address]  [address]  [facility] [address] [phone]  Schedule an appointment as soon as possible for a visit in 3 day(s)            Medications Prescribed:  Current Discharge Medication List              Supplementary Documentation:

## 2025-01-21 LAB
Q-T INTERVAL: 270 MS
QRS DURATION: 78 MS
QTC CALCULATION (BEZET): 466 MS
R AXIS: 185 DEGREES
T AXIS: 72 DEGREES
VENTRICULAR RATE: 179 BPM

## 2025-01-21 NOTE — ED NOTES
Pt and family contacted the department with concerns over dosing instructions on diltiazem. RN did review the instructions on the medication and RN did explain the dosing difference between types of regular and extended release medications. Family and patient were encouraged to check and HR>60 before every dose of medication, and to follow up as directed. Pt states they have an appt tomorrow with the cardiologist.

## 2025-01-21 NOTE — ED INITIAL ASSESSMENT (HPI)
Patient here with palpitations. Discharged this afternoon. States symptoms returned. Denies chest pain or shortness of breath.

## 2025-01-21 NOTE — ED PROVIDER NOTES
Patient Seen in: [facility] [place]      History     Chief Complaint   Patient presents with    Arrythmia/Palpitations     Stated Complaint: was here earlier today and advised to return if symptoms return    Subjective:   HPI      69-year-old female presents with palpitations that started about 15 minutes prior to arrival.  I saw this patient earlier today when she presented with similar symptoms which resolved with vagal maneuvers shortly after she arrived.  Symptoms resolved prior to EKG however monitor tracing showed likely SVT.  Patient states she tried medical maneuvers this time without improvement.  She reports feeling slightly lightheaded.  Denies any chest pain or shortness of breath.      Objective:     Past Medical History:    Migraines    occ    Osteoarthritis    shoulder    Visual impairment    contacts/glasses              Past Surgical History:   Procedure Laterality Date    Shoulder arthroplasty Left     Tonsillectomy                  No pertinent social history.                Physical Exam     ED Triage Vitals   BP 01/20/25 1852 120/75   Pulse 01/20/25 1852 (!) 185   Resp 01/20/25 1852 20   Temp 01/20/25 1855 98.4 °F (36.9 °C)   Temp src 01/20/25 1855 Temporal   SpO2 01/20/25 1852 100 %   O2 Device 01/20/25 1852 None (Room air)       Current Vitals:   Vital Signs  BP: 109/73  Pulse: 83  Resp: 18  Temp: 98.4 °F (36.9 °C)  Temp src: Temporal    Oxygen Therapy  SpO2: 99 %  O2 Device: None (Room air)        Physical Exam  Vitals and nursing note reviewed.   Constitutional:       Appearance: She is well-developed.   HENT:      Head: Normocephalic and atraumatic.      Mouth/Throat:      Mouth: Mucous membranes are moist.   Eyes:      General: No scleral icterus.  Cardiovascular:      Rate and Rhythm: Regular rhythm. Tachycardia present.   Pulmonary:      Effort: Pulmonary effort is normal.      Breath sounds: Normal breath sounds.   Skin:     General: Skin is warm and dry.    Neurological:      General: No focal deficit present.      Mental Status: She is alert and oriented to person, place, and time.      Cranial Nerves: No cranial nerve deficit.      Motor: No weakness.   Psychiatric:         Mood and Affect: Mood normal.         Behavior: Behavior normal.             ED Course     Labs Reviewed   TROPONIN I HIGH SENSITIVITY - Abnormal; Notable for the following components:       Result Value    Troponin I (High Sensitivity) 48 (*)     All other components within normal limits   LIPID PANEL     EKG #1    Rate, intervals and axes as noted on EKG Report.  Rate: 181  Rhythm: SVT  Reading: SVT, nonspecific ST/T wave changes    EKG #2    Rate, intervals and axes as noted on EKG Report.  Rate: 105  Rhythm: Sinus Rhythm  Reading: Sinus tachycardia, no ST/T wave changes                  A total of 40 minutes of critical care time (exclusive of billable procedures) was administered to manage the patient's unstable vital signs due to her SVT.  This involved direct patient intervention, complex decision making, and/or extensive discussions with the patient, family, and clinical staff.       MDM      69-year-old female presents with palpitations that started about 15 minutes prior to arrival.     Differential includes but is not limited to SVT, other tachyarrhythmia such as A-fib or flutter    Chart reviewed from ED visit earlier today when patient similar symptoms that resolved after vagal maneuvers.  Labs are unremarkable with negative troponin.  Chest x-ray showed no acute findings.    Patient was cardioverted successfully with 6 mg of adenosine into normal sinus rhythm.  Patient was monitored for 2 hours with no further recurrence.    Troponin is minimally elevated at 48.  discussed with [facility] cardiology NP Elena.  Mild troponin leak likely rate related.  Patient denies any symptoms concerning for ACS.  Denies any chest pain now or during episode of SVT.  Patient to be discharged home with Rx  for Cardizem 120 mg daily and follow-up in cardiology clinic.  Patient is comfortable with this plan.  Return precaution discussed.      Medical Decision Making  Amount and/or Complexity of Data Reviewed  Labs: ordered. Decision-making details documented in ED Course.  ECG/medicine tests: ordered and independent interpretation performed. Decision-making details documented in ED Course.  Discussion of management or test interpretation with external provider(s): Cardiology    Risk  Prescription drug management.  Decision regarding hospitalization.        Disposition and Plan     Clinical Impression:  1. SVT (supraventricular tachycardia) ([facility])         Disposition:  Discharge  1/20/2025  9:17 pm    Follow-up:  [place] [id number]  [address]  [facility] [address] [phone]  Schedule an appointment as soon as possible for a visit in 2 day(s)            Medications Prescribed:  Current Discharge Medication List        START taking these medications    Details   dilTIAZem ER (CARDIZEM CD) 120 MG Oral Capsule SR 24 Hr Take 1 capsule (120 mg total) by mouth daily.  Qty: 30 capsule, Refills: 0                 Supplementary Documentation:

## 2025-03-04 ENCOUNTER — LAB ENCOUNTER (OUTPATIENT)
Dept: LAB | Age: 70
End: 2025-03-04
Attending: INTERNAL MEDICINE
Payer: MEDICARE

## 2025-03-04 DIAGNOSIS — Z86.79 PERSONAL HISTORY OF UNSPECIFIED CIRCULATORY DISEASE: Primary | ICD-10-CM

## 2025-03-04 DIAGNOSIS — I47.10 PAROXYSMAL SUPRAVENTRICULAR TACHYCARDIA (HCC): ICD-10-CM

## 2025-03-04 LAB
ANION GAP SERPL CALC-SCNC: 10 MMOL/L (ref 0–18)
BASOPHILS # BLD AUTO: 0.06 X10(3) UL (ref 0–0.2)
BASOPHILS NFR BLD AUTO: 0.8 %
BUN BLD-MCNC: 25 MG/DL (ref 9–23)
CALCIUM BLD-MCNC: 10.7 MG/DL (ref 8.7–10.6)
CHLORIDE SERPL-SCNC: 101 MMOL/L (ref 98–112)
CO2 SERPL-SCNC: 29 MMOL/L (ref 21–32)
CREAT BLD-MCNC: 0.92 MG/DL
EGFRCR SERPLBLD CKD-EPI 2021: 68 ML/MIN/1.73M2 (ref 60–?)
EOSINOPHIL # BLD AUTO: 0.23 X10(3) UL (ref 0–0.7)
EOSINOPHIL NFR BLD AUTO: 2.9 %
ERYTHROCYTE [DISTWIDTH] IN BLOOD BY AUTOMATED COUNT: 12.1 %
FASTING STATUS PATIENT QL REPORTED: YES
GLUCOSE BLD-MCNC: 145 MG/DL (ref 70–99)
HCT VFR BLD AUTO: 40.1 %
HGB BLD-MCNC: 13.7 G/DL
IMM GRANULOCYTES # BLD AUTO: 0.02 X10(3) UL (ref 0–1)
IMM GRANULOCYTES NFR BLD: 0.3 %
LYMPHOCYTES # BLD AUTO: 2.49 X10(3) UL (ref 1–4)
LYMPHOCYTES NFR BLD AUTO: 31.9 %
MCH RBC QN AUTO: 32.2 PG (ref 26–34)
MCHC RBC AUTO-ENTMCNC: 34.2 G/DL (ref 31–37)
MCV RBC AUTO: 94.4 FL
MONOCYTES # BLD AUTO: 0.78 X10(3) UL (ref 0.1–1)
MONOCYTES NFR BLD AUTO: 10 %
NEUTROPHILS # BLD AUTO: 4.22 X10 (3) UL (ref 1.5–7.7)
NEUTROPHILS # BLD AUTO: 4.22 X10(3) UL (ref 1.5–7.7)
NEUTROPHILS NFR BLD AUTO: 54.1 %
OSMOLALITY SERPL CALC.SUM OF ELEC: 297 MOSM/KG (ref 275–295)
PLATELET # BLD AUTO: 273 10(3)UL (ref 150–450)
POTASSIUM SERPL-SCNC: 4.4 MMOL/L (ref 3.5–5.1)
RBC # BLD AUTO: 4.25 X10(6)UL
SODIUM SERPL-SCNC: 140 MMOL/L (ref 136–145)
WBC # BLD AUTO: 7.8 X10(3) UL (ref 4–11)

## 2025-03-04 PROCEDURE — 80048 BASIC METABOLIC PNL TOTAL CA: CPT

## 2025-03-04 PROCEDURE — 85025 COMPLETE CBC W/AUTO DIFF WBC: CPT

## 2025-03-04 PROCEDURE — 36415 COLL VENOUS BLD VENIPUNCTURE: CPT

## 2025-03-20 VITALS — HEIGHT: 70 IN | WEIGHT: 170 LBS | BODY MASS INDEX: 24.34 KG/M2

## 2025-03-20 RX ORDER — METOPROLOL TARTRATE 25 MG/1
12.5 TABLET, FILM COATED ORAL 2 TIMES DAILY
COMMUNITY
End: 2025-03-27

## 2025-03-20 NOTE — PAT NURSING NOTE
PreOp Instructions     You are scheduled for: a Cardiac Procedure     Date of Procedure: 03/27/25     Diet Instructions: Do not eat or drink anything after midnight including gum, mints, candy, etc the night before your procedure.     Medications to Stop:   DO NOT TAKE Metoprolol for 24 hours prior to your procedure, your last dose will be on Wednesday 3/26 in the morning.   DO NOT TAKE any herbal supplements and vitamins the morning of your procedure.     Skin Prep : Shower with antibacterial soap using a clean washcloth, prior to procedure. Once dried off, no lotions/powders/creams/ointments, etc., Do not shave the procedure area, this will be completed at the hospital during the preparation phase.     Arrival Time: The day prior to your procedure you will receive a phone call between 3:00 pm - 6:00 pm with your arrival time. If you haven't received a phone call, please check your voicemail messages., If you did not receive a voice mail and it is after 6:00 pm, please call the nursing supervisor at [phone].    Driving After Procedure: Sedation will be given so you WILL NOT be able to drive home. You will need a responsible adult  to drive you home. You can NOT take uber or taxi unless approved by your physician in advance.     Discharge Teaching: Your nurse will give you specific instructions before discharge, Most people can resume normal activities in 2-3 days, Any questions, please call the physician's office

## 2025-03-27 ENCOUNTER — HOSPITAL ENCOUNTER (OUTPATIENT)
Dept: INTERVENTIONAL RADIOLOGY/VASCULAR | Facility: HOSPITAL | Age: 70
Discharge: HOME | End: 2025-03-27
Attending: INTERNAL MEDICINE
Payer: MEDICARE

## 2025-03-27 ENCOUNTER — HOSPITAL ENCOUNTER (OUTPATIENT)
Dept: INTERVENTIONAL RADIOLOGY/VASCULAR | Facility: HOSPITAL | Age: 70
Discharge: HOME | End: 2025-03-27
Attending: INTERNAL MEDICINE | Admitting: INTERNAL MEDICINE
Payer: MEDICARE

## 2025-03-27 VITALS
HEART RATE: 81 BPM | OXYGEN SATURATION: 95 % | SYSTOLIC BLOOD PRESSURE: 115 MMHG | RESPIRATION RATE: 9 BRPM | TEMPERATURE: 97 F | DIASTOLIC BLOOD PRESSURE: 64 MMHG

## 2025-03-27 DIAGNOSIS — I47.10 SVT (SUPRAVENTRICULAR TACHYCARDIA) (HCC): ICD-10-CM

## 2025-03-27 PROCEDURE — 99153 MOD SED SAME PHYS/QHP EA: CPT | Performed by: INTERNAL MEDICINE

## 2025-03-27 PROCEDURE — 02583ZZ DESTRUCTION OF CONDUCTION MECHANISM, PERCUTANEOUS APPROACH: ICD-10-PCS | Performed by: INTERNAL MEDICINE

## 2025-03-27 PROCEDURE — 99152 MOD SED SAME PHYS/QHP 5/>YRS: CPT | Performed by: INTERNAL MEDICINE

## 2025-03-27 PROCEDURE — 02K83ZZ MAP CONDUCTION MECHANISM, PERCUTANEOUS APPROACH: ICD-10-PCS | Performed by: INTERNAL MEDICINE

## 2025-03-27 PROCEDURE — 4A0234Z MEASUREMENT OF CARDIAC ELECTRICAL ACTIVITY, PERCUTANEOUS APPROACH: ICD-10-PCS | Performed by: INTERNAL MEDICINE

## 2025-03-27 PROCEDURE — 4A023FZ MEASUREMENT OF CARDIAC RHYTHM, PERCUTANEOUS APPROACH: ICD-10-PCS | Performed by: INTERNAL MEDICINE

## 2025-03-27 PROCEDURE — 93653 COMPRE EP EVAL TX SVT: CPT | Performed by: INTERNAL MEDICINE

## 2025-03-27 PROCEDURE — 93623 PRGRMD STIMJ&PACG IV RX NFS: CPT | Performed by: INTERNAL MEDICINE

## 2025-03-27 RX ORDER — MIDAZOLAM HYDROCHLORIDE 1 MG/ML
INJECTION INTRAMUSCULAR; INTRAVENOUS
Status: COMPLETED
Start: 2025-03-27 | End: 2025-03-27

## 2025-03-27 RX ORDER — LIDOCAINE HYDROCHLORIDE AND EPINEPHRINE 10; 10 MG/ML; UG/ML
INJECTION, SOLUTION INFILTRATION; PERINEURAL
Status: COMPLETED
Start: 2025-03-27 | End: 2025-03-27

## 2025-03-27 RX ORDER — ISOPROTERENOL HYDROCHLORIDE 0.2 MG/ML
INJECTION, SOLUTION INTRAVENOUS
Status: DISCONTINUED
Start: 2025-03-27 | End: 2025-03-27 | Stop reason: WASHOUT

## 2025-03-27 RX ORDER — SODIUM CHLORIDE 9 MG/ML
INJECTION, SOLUTION INTRAVENOUS
Status: DISCONTINUED | OUTPATIENT
Start: 2025-03-28 | End: 2025-03-27 | Stop reason: HOSPADM

## 2025-03-27 RX ORDER — HEPARIN SODIUM 5000 [USP'U]/ML
INJECTION, SOLUTION INTRAVENOUS; SUBCUTANEOUS
Status: COMPLETED
Start: 2025-03-27 | End: 2025-03-27

## 2025-03-27 RX ORDER — CHLORHEXIDINE GLUCONATE 40 MG/ML
SOLUTION TOPICAL
Status: DISCONTINUED | OUTPATIENT
Start: 2025-03-28 | End: 2025-03-27 | Stop reason: HOSPADM

## 2025-03-27 RX ORDER — HEPARIN SODIUM 1000 [USP'U]/ML
INJECTION, SOLUTION INTRAVENOUS; SUBCUTANEOUS
Status: COMPLETED
Start: 2025-03-27 | End: 2025-03-27

## 2025-03-27 RX ORDER — ISOPROTERENOL HYDROCHLORIDE 0.2 MG/ML
INJECTION, SOLUTION INTRAVENOUS
Status: COMPLETED
Start: 2025-03-27 | End: 2025-03-27

## 2025-03-27 NOTE — H&P
ELECTROPHYSIOLOGY - PRE-PROCEDURE H&P  Name: Adama Coronado  MRN: [id number]    SUBJECTIVE  Date of Service: 3/27/2025       PROCEDURE:  EPS/SVT ablation    INDICATION FOR PROCEDURE/CHIEF COMPLAINT: symptomatic SVT    RATIONALE FOR SEDATION:  Comfort and Pain relief during procedure    ALLERGIES AND DRUG REACTIONS  Allergies[1]       HISTORY OF PRESENT ILLNESS  69yoF with h/o symptomatic SVT with 2 prior ED visits presenting now for ablation.     PAST MEDICAL, SURGICAL, AND SOCIAL HISTORY  Past Medical History:    Arrhythmia    Migraines    occ    Osteoarthritis    shoulder    Visual impairment    contacts/glasses       Past Surgical History:   Procedure Laterality Date    Shoulder arthroplasty Left     Tonsillectomy           REVIEW OF SYSTEMS  No chest discomfort, arm/neck/jaw pain, palpitations, lightheadedness, dizziness, fever, chills, nausea, vomiting, diaphoresis, syncope or pre-syncope.      OBJECTIVE  Vitals:    03/27/25 1100   BP: 120/72   BP Location: Right arm   Pulse: 62   Resp: 13   Temp: 97 °F (36.1 °C)   TempSrc: Temporal   SpO2: 96%       PRIOR TO ADMISSION MEDICATIONS  Prior to Admission Medications   Prescriptions Last Dose Informant Patient Reported? Taking?   metoprolol tartrate 25 MG Oral Tab 3/26/2025 Morning  Yes Yes   Sig: Take 0.5 tablets (12.5 mg total) by mouth 2 (two) times daily.      Facility-Administered Medications: None           PHYSICAL EXAMINATION     Constitutional:  No apparent distress, Alert oriented x 3.  Neck:   No JVD present.  CV:   Regular rate, normal S1S2, no murmur/rub/gallop.  Respiratory:  No distress, clear to auscultation bilaterally.  MSK:  No edema.   Neurological:  Alert and oriented to person, place, and time.     REVIEW OF LABORATORY DATA    CBC:    Lab Results   Component Value Date    WBC 7.8 03/04/2025    WBC 8.4 01/20/2025    WBC 9.6 02/07/2022     Lab Results   Component Value Date    HGB 13.7 03/04/2025    HGB 14.2 01/20/2025    HGB 13.4 02/07/2022       Lab Results   Component Value Date    .0 03/04/2025    .0 01/20/2025    .0 02/07/2022     BMP:   No results found for: \"GLUCOSE\"  Lab Results   Component Value Date    K 4.4 03/04/2025    K 4.2 01/20/2025    K 4.7 02/07/2022     Lab Results   Component Value Date    BUN 25 (H) 03/04/2025    BUN 28 (H) 01/20/2025    BUN 21 (H) 02/07/2022     Lab Results   Component Value Date    CREATSERUM 0.92 03/04/2025    CREATSERUM 0.69 01/20/2025    CREATSERUM 0.68 02/07/2022     No results found for: \"PT\", \"INR\"      IMPRESSION  SVT    PLAN  EPS, possible SVT ablation    James Mccormack MD  Cardiac Electrophysiology  [facility]           [1]   Allergies  Allergen Reactions    Amoxicillin RASH

## 2025-03-27 NOTE — DISCHARGE INSTRUCTIONS
HOME CARE INSTRUCTIONS FOLLOWING CARDIAC ABLATION (RFA) OR ELECTROPHYSIOLOGIC STUDY (EPS)    Activity:  - DO NOT drive after the procedure. You may resume driving late the following day according to the nurse or physician's instructions.  - Plan on resting and relaxing tonight and tomorrow. It will be normal to tire easily for the first few days, depending on the length of the procedure and the amount of sedation you received.   - DO NOT lift anything over 10 pounds for the next 24 hours.  - Avoid sexual activity for the next 24 hours.  - Avoid repeated stair use and excessive walking for the next 24 hours.   - Avoid drinking alcohol for the next 24 hours.  - Resume your normal activity after 48 hours, or as instructed by your physician.    What is Normal:  - You may feel extra heart beats. If these beats come too often or you feel an episode of multiple fast heartbeats, notify your physician.  - The procedure site may appear bruised or discolored.  - There may be a small amount of drainage on the bandage  - There may be mild tenderness to the procedure site when touched, which is common.     Special Instructions:  - The bandage is to remain in place for 24 hours. Keep the bandage clean and dry. Do not submerge the site for 72 hours (no tub, baths or pools).  - After 24 hours you must remove the bandage. Wash the procedure site gently with soap and water. If you choose to wear a bandaid for a few days, make sure it remains clean and dry and that it is changed daily.  - The day after the procedure you may shower after you remove your dressing (but not baths).    MONITOR YOUR GROIN SITE FOR ANY BLEEDING OR SWELLING.IF EITHER OCCUR, LAY FLAT, AND HAVE SOMEONE HOLD PRESSURE OVER THE SITE FOR 20 MINUTES.IF UNABLE TO STOP, CALL 911.    When you should NOTIFY YOUR PHYSICIAN:  - If you have shortness of breath or a persistent cough  - If you have chest pain (angina)  - If you have persistent pain at the procedure site  - If  you experience a fever with a temperature >101 degrees, chills, infection (redness, swelling, thick yellow drainage, or a foul odor from procedure site)    Other:  - You may resume your present diet, unless otherwise directed by your physician  - You may resume all of your medications as prescribed, unless otherwise directed by your physician. A list of your medications was provided to you at discharge..  - Do not make any personal/business decisions and/or sign any legal documents for the next 24 hours.

## 2025-03-27 NOTE — PROGRESS NOTES
Pt tolerated po well. Up to bathroom voided,amublated in alonso. Rt/l groin remains c/d/I and soft. Discharge instructions reviewed and questions answered. IV d.c'd and pt discharged to home in stable condition via wheelchair.

## 2025-03-27 NOTE — PROCEDURES
CARDIAC ELECTROPHYSIOLOGY PROCEDURE NOTE    DATE OF PROCEDURE:  3/27/2025       James Mccormack MD     PROCEDURE(S) PERFORMED:   1. Comprehensive electrophysiology study with SVT ablation (slow pathway ablation for AVNRT)  2. Programmed stimulation after drug infusion (isoproterenol gtt)  3. 3D mapping  4. LA pacing and recording    PRE-PROCEDURE DIAGNOSES:   Supraventricular tachycardia    POST-PROCEDURE DIAGNOSES:    AV Champ Reentrant Tachycardia (AVNRT)    ANESTHESIA:   Moderate conscious sedation was provided under direct physician supervision with the sedation trained nurse using 4mg of intravenous Versed and 100 mcg of intravenous fentanyl, with start time of 1353 and end time of 1619. There were no complications.     INDICATION(S) FOR PROCEDURE:   Adama Coronado is a 69 year old woman with h/o symptomatic SVT.        PROCEDURE DESCRIPTION AND FINDINGS:   The risks and benefits of the procedure were explained in detail. The patient understood that risks included, but were not limited to, bleeding, vascular complications, cardiac perforation and tamponade, need for permanent pacemaker, phrenic nerve injury, esophageal injury, heart failure, stroke, heart attack, and death. After all questions were answered, the patient provided informed, written consent.    The patient was brought to the Cardiac Electrophysiology Laboratory in the fasting and unsedated state. The patient was connected to an external defibrillator and an EP recording system. Blood pressure, heart rate, oxygen saturation, and cardiac rhythm were continuously monitored. The bilateral groins were prepared and draped in the usual sterile fashion. Baseline rhythm was normal sinus at a cycle length of 887 msec,  msec, QRS 94 msec, and  msec.     After administration of local anesthetic, vascular access was obtained using modified Seldinger technique and fluoroscopic guidance: R femoral vein 8F, 7F sheaths. L femoral vein: two 7F  sheaths. A CRD2 catheter was advanced through the 8F in the right and positioned at the His with mapping guidance. A deflectable decapolar catheter was placed in the 7F on the right and positioned in the coronary sinus. 2 deflectable quadripolar catheters were advanced through the sheaths on the left and positioned in the high right atrium and right ventericle.      EP study was then performed:   AH interval 62 ms   HV interval 41 ms.  VA Wenckebach: 340ms.  AV Wenckebach: 290ms.  AVNERP 600/210ms  AERP 600/<210 ms.  A2H2 discontinuity noted at 600/270ms.   Parahisian pacing: lulu response    Next, isoproterenol drip was started at 1 mcg/min and increased to 5mcg/min for induction.  Despite aggressive atrial and ventricular burst pacing and extrastimuli, SVT was induced only 1 time. TCL 300ms, septal VA time 10ms. RV overdrive pacing was attempted but the tachycardia broke. SVT and echos were not inducible after this one event despite multiple attempts.     Based on available clinical data and above information, decision was made to perform slow pathway ablation for AVNRT. The His catheter was removed and the 8F sheath exchanged for a Vizigo sheath, through which a Qdot ablation catheter was advanced to the right atrium.     Mapping was performed with the catheter to identify the locations of the His, coronary sinus os, and tricuspid annulus. The catheter was then placed at the anatomic slow pathway region.  RF lesions were delivered in this region without junctional beats seen.    Following ablation, aggressive atrial burst pacing and extrastimuli, as well as ventricular burst pacing and extrastimuli were performed. Tachycardia was not inducible and no echo beats were seen.    Post ablation measurements were as follows:  ID 148ms, QRS 101ms, QT 380ms  AH 63ms  HV 39ms    At the conclusion of the procedure, catheters and sheaths were withdrawn from the body and hemostasis was achieved with 4 Vascade devices as well  as manual pressure. The patient tolerated the entire procedure well, with no evidence of any immediate complications.  The patient was transferred to the holding in stable condition for vascular recovery.     COMPLICATIONS:  None    ESTIMATED BLOOD LOSS:  Minimal    PROCEDURE SUMMARY:   1. EP study suggestive of typical AVNRT but unable to perform maneuvers in tachycardia.  2. Slow pathway modification for AVNRT performed without immediate complication.    James Mccormack MD  Cardiac Electrophysiology  [facility]

## 2025-07-02 ENCOUNTER — HOSPITAL ENCOUNTER (OUTPATIENT)
Dept: BONE DENSITY | Age: 70
Discharge: HOME | End: 2025-07-02
Attending: PHYSICIAN ASSISTANT
Payer: MEDICARE

## 2025-07-02 ENCOUNTER — LAB ENCOUNTER (OUTPATIENT)
Dept: LAB | Age: 70
End: 2025-07-02
Attending: PHYSICIAN ASSISTANT
Payer: MEDICARE

## 2025-07-02 DIAGNOSIS — Z78.0 POSTMENOPAUSAL STATUS (AGE-RELATED) (NATURAL): ICD-10-CM

## 2025-07-02 DIAGNOSIS — E83.52 HYPERCALCEMIA: ICD-10-CM

## 2025-07-02 LAB
CALCIUM BLD-MCNC: 10.7 MG/DL (ref 8.7–10.6)
CREAT BLD-MCNC: 0.89 MG/DL (ref 0.55–1.02)
PHOSPHATE SERPL-MCNC: 3.8 MG/DL (ref 2.4–5.1)
PTH-INTACT SERPL-MCNC: 109.7 PG/ML (ref 18.5–88)

## 2025-07-02 PROCEDURE — 36415 COLL VENOUS BLD VENIPUNCTURE: CPT

## 2025-07-02 PROCEDURE — 82310 ASSAY OF CALCIUM: CPT

## 2025-07-02 PROCEDURE — 83970 ASSAY OF PARATHORMONE: CPT

## 2025-07-02 PROCEDURE — 82565 ASSAY OF CREATININE: CPT

## 2025-07-02 PROCEDURE — 84100 ASSAY OF PHOSPHORUS: CPT

## 2025-07-02 PROCEDURE — 77080 DXA BONE DENSITY AXIAL: CPT | Performed by: PHYSICIAN ASSISTANT

## (undated) DEVICE — Device: Brand: STABLECUT®

## (undated) DEVICE — COVER,BOOT,FOAM,NON-SKID,HOOK-LOOP,XLG: Brand: MEDLINE INDUSTRIES, INC.

## (undated) DEVICE — HANDPIECE SET WITH HIGH FLOW TIP AND SUCTION TUBE: Brand: INTERPULSE

## (undated) DEVICE — DRAPE,U/SHT,SPLIT,FILM,60X84,STERILE: Brand: MEDLINE

## (undated) DEVICE — UNDYED BRAIDED (POLYGLACTIN 910), SYNTHETIC ABSORBABLE SUTURE: Brand: COATED VICRYL

## (undated) DEVICE — #15 STERILE STAINLESS BLADE: Brand: STERILE STAINLESS BLADES

## (undated) DEVICE — DELTA XTEND METAGLENE CENTRAL GUIDE PIN DIAMETER 2,5 X 190 MM: Brand: DELTA XTEND

## (undated) DEVICE — LEGEND II FIXATION PIN .125IN X 3IN: Brand: LEGEND II

## (undated) DEVICE — PAD SACRAL SPAN AID

## (undated) DEVICE — 3M™ IOBAN™ 2 ANTIMICROBIAL INCISE DRAPE 6648EZ: Brand: IOBAN™ 2

## (undated) DEVICE — SUTURE VICRYL 3-0

## (undated) DEVICE — 1010 S-DRAPE TOWEL DRAPE 10/BX: Brand: STERI-DRAPE™

## (undated) DEVICE — ESSENTIAL SHOULDER SLING L

## (undated) DEVICE — DRESSING AQUACEL AG 3.5 X 10

## (undated) DEVICE — SPK10022 SCHLEIN POSITIONING KIT: Brand: SPK10022 SCHLEIN POSITIONING KIT

## (undated) DEVICE — ORTHO CDS-LF: Brand: MEDLINE INDUSTRIES, INC.

## (undated) DEVICE — KIT TRC TRIMANO BEACH CHR ARM

## (undated) DEVICE — 3M™ STERI-STRIP™ REINFORCED ADHESIVE SKIN CLOSURES, R1547, 1/2 IN X 4 IN (12 MM X 100 MM), 6 STRIPS/ENVELOPE: Brand: 3M™ STERI-STRIP™

## (undated) DEVICE — SUTURE VICRYL 0 CP-2

## (undated) DEVICE — SCD SLEEVE KNEE HI BLEND

## (undated) DEVICE — SOL  .9 1000ML BAG

## (undated) DEVICE — GOWN SURG AERO CHROME XXL

## (undated) DEVICE — DELTA XTEND DRILL BIT DIAMETER 2,5 X 170 MM: Brand: DELTA XTEND

## (undated) DEVICE — 3M™ MICROFOAM™ TAPE 1528-4: Brand: 3M™ MICROFOAM™

## (undated) DEVICE — WRAP COOLING SHLDR W/ICE PILLO

## (undated) DEVICE — STERILE SYNTHETIC POLYISOPRENE POWDER-FREE SURGICAL GLOVES WITH HYDROGEL COATING: Brand: PROTEXIS

## (undated) DEVICE — STERILE POLYISOPRENE POWDER-FREE SURGICAL GLOVES: Brand: PROTEXIS

## (undated) DEVICE — DELTA XTEND GLENOID CANNULATED STOP DRILL DIA 7.5MM: Brand: DELTA XTEND

## (undated) DEVICE — SOL  .9 1000ML BTL